# Patient Record
Sex: FEMALE | Race: WHITE
[De-identification: names, ages, dates, MRNs, and addresses within clinical notes are randomized per-mention and may not be internally consistent; named-entity substitution may affect disease eponyms.]

---

## 2018-04-27 ENCOUNTER — HOSPITAL ENCOUNTER (OUTPATIENT)
Dept: HOSPITAL 45 - C.MRIBC | Age: 43
Discharge: HOME | End: 2018-04-27
Attending: ANESTHESIOLOGY
Payer: COMMERCIAL

## 2018-04-27 DIAGNOSIS — M47.812: ICD-10-CM

## 2018-04-27 DIAGNOSIS — M48.02: Primary | ICD-10-CM

## 2018-04-27 NOTE — DIAGNOSTIC IMAGING REPORT
MRI OF THE CERVICAL SPINE WITHOUT IV CONTRAST



CLINICAL HISTORY: Chronic neck pain. Left arm numbness.



COMPARISON STUDY: MRI of the cervical spine dated 02/24/2016.



TECHNIQUE: MRI of the cervical spine is performed utilizing various T1 and T2

weighted sequences in the axial, sagittal, and coronal planes. IV contrast was

not administered for this examination. The examination is degraded by motion

artifact.



FINDINGS:



Cervical spine: Vertebral body height and alignment are maintained throughout

the cervical spine. Normal marrow signal intensity is preserved throughout the

visualized osseous structures. The atlantodental articulation is maintained. The

spinous processes appear intact. No destructive bony lesion is seen. 



A small hemangioma is noted in the body of C6.



Intervertebral discs: Degenerative disc desiccation is seen throughout the

cervical spine. The disc spaces are maintained.



Spinal cord: The cervical spinal cord is normal in morphology and signal

intensity.



C2-C3: Unremarkable.



C3-C4: Uncovertebral and facet arthropathy cause mild left neural foraminal

stenosis. The central canal is patent.



C4-C5: A small posterior disc osteophyte complex is eccentric to the left.

Uncovertebral arthropathy causes mild left-sided neural foraminal stenosis.

There is no significant acquired compromise of the central canal.



C5-C6: A posterior disc osteophyte complex abuts the ventral cord. Uncovertebral

and facet arthropathy cause mild-to-moderate, left greater than right, neural

foraminal stenosis.



C6-C7: There is a disc herniation eccentric to left which measures up to 7 mm.

In conjunction with a posterior disc osteophyte complex, this effaces the

ventral cord. The minimum AP canal diameter measures 6 mm. Uncovertebral and

facet arthropathy cause moderate to severe left and moderate right neural

foraminal stenosis.



C7-T1: Unremarkable.



Soft tissues: The prevertebral and paraspinous soft tissues are normal in

appearance.



Brain parenchyma: Partially imaged brain parenchyma at the skull base is grossly

normal. Partially empty sella is an incidental finding.





IMPRESSION:



1. Motion degraded examination.



2. There is cervical spondylosis at C6-C7 where a disc herniation and disc

osteophyte complex effaces the ventral cord.



3. Spondylotic change at additional levels as above. See discussion for detailed

level by level analysis.



4. The cervical spinal cord is normal in morphology and signal intensity.



5. Additional findings as above.









Dictated:  4/27/2018 5:38 PM

Transcribed:  4/27/2018 7:30 PM

[email or web address]







Electronically signed by:  Shane Douglass M.D.

4/27/2018 7:50 PM



Dictated Date/Time:  4/27/2018 5:38 PM

## 2018-05-03 ENCOUNTER — HOSPITAL ENCOUNTER (OUTPATIENT)
Dept: HOSPITAL 45 - C.PATHSPEC | Age: 43
Discharge: HOME | End: 2018-05-03
Attending: SURGERY
Payer: COMMERCIAL

## 2018-05-03 DIAGNOSIS — D22.9: Primary | ICD-10-CM

## 2018-08-14 ENCOUNTER — HOSPITAL ENCOUNTER (OUTPATIENT)
Dept: HOSPITAL 45 - C.NEUR | Age: 43
Discharge: HOME | End: 2018-08-14
Attending: PHYSICIAN ASSISTANT
Payer: COMMERCIAL

## 2018-08-14 VITALS — SYSTOLIC BLOOD PRESSURE: 127 MMHG | HEART RATE: 93 BPM | DIASTOLIC BLOOD PRESSURE: 54 MMHG

## 2018-08-14 VITALS
BODY MASS INDEX: 39.18 KG/M2 | WEIGHT: 229.5 LBS | BODY MASS INDEX: 39.18 KG/M2 | HEIGHT: 64.02 IN | WEIGHT: 229.5 LBS | HEIGHT: 64.02 IN

## 2018-08-14 DIAGNOSIS — R06.81: Primary | ICD-10-CM

## 2018-08-14 DIAGNOSIS — R06.83: ICD-10-CM

## 2018-08-14 DIAGNOSIS — F90.9: ICD-10-CM

## 2018-08-14 DIAGNOSIS — R53.83: ICD-10-CM

## 2018-08-14 DIAGNOSIS — Z82.0: ICD-10-CM
